# Patient Record
Sex: MALE | Race: WHITE | URBAN - METROPOLITAN AREA
[De-identification: names, ages, dates, MRNs, and addresses within clinical notes are randomized per-mention and may not be internally consistent; named-entity substitution may affect disease eponyms.]

---

## 2017-04-19 ENCOUNTER — TELEPHONE (OUTPATIENT)
Dept: OTHER | Facility: CLINIC | Age: 22
End: 2017-04-19

## 2017-07-08 ENCOUNTER — OFFICE VISIT (OUTPATIENT)
Dept: URGENT CARE | Facility: URGENT CARE | Age: 22
End: 2017-07-08
Payer: COMMERCIAL

## 2017-07-08 VITALS
OXYGEN SATURATION: 97 % | SYSTOLIC BLOOD PRESSURE: 110 MMHG | HEART RATE: 71 BPM | DIASTOLIC BLOOD PRESSURE: 60 MMHG | TEMPERATURE: 98 F

## 2017-07-08 DIAGNOSIS — H10.33 ACUTE CONJUNCTIVITIS OF BOTH EYES, UNSPECIFIED ACUTE CONJUNCTIVITIS TYPE: Primary | ICD-10-CM

## 2017-07-08 PROCEDURE — 99213 OFFICE O/P EST LOW 20 MIN: CPT | Performed by: FAMILY MEDICINE

## 2017-07-08 RX ORDER — CIPROFLOXACIN HYDROCHLORIDE 3.5 MG/ML
1 SOLUTION/ DROPS TOPICAL 3 TIMES DAILY
Qty: 1.1 ML | Refills: 0 | Status: SHIPPED | OUTPATIENT
Start: 2017-07-08 | End: 2017-07-15

## 2017-07-08 NOTE — MR AVS SNAPSHOT
"              After Visit Summary   2017    Festus Cantu    MRN: [id number]           Patient Information     Date Of Birth          1995        Visit Information        Provider Department      2017 3:55 PM Angel Rangel MD [facility]        Today's Diagnoses     Acute conjunctivitis of both eyes, unspecified acute conjunctivitis type    -  1       Follow-ups after your visit        Who to contact     If you have questions or need follow up information about today's clinic visit or your schedule please contact [facility] directly at [phone].  Normal or non-critical lab and imaging results will be communicated to you by MyChart, letter or phone within 4 business days after the clinic has received the results. If you do not hear from us within 7 days, please contact the clinic through [employer] or phone. If you have a critical or abnormal lab result, we will notify you by phone as soon as possible.  Submit refill requests through [employer] or call your pharmacy and they will forward the refill request to us. Please allow 3 business days for your refill to be completed.          Additional Information About Your Visit        MyChart Information     [employer] lets you send messages to your doctor, view your test results, renew your prescriptions, schedule appointments and more. To sign up, go to [email or web address] . Click on \"Log in\" on the left side of the screen, which will take you to the Welcome page. Then click on \"Sign up Now\" on the right side of the page.     You will be asked to enter the access code listed below, as well as some personal information. Please follow the directions to create your username and password.     Your access code is: [email or web address]  Expires: 11/3/2017  4:07 PM     Your access code will  in 90 days. If you need help or a new code, please call your [place] clinic or [phone].        Care EveryWhere ID     This is " your Care EveryWhere ID. This could be used by other organizations to access your [facility] medical records  [id number]        Your Vitals Were     Pulse Temperature Pulse Oximetry             71 98  F (36.7  C) (Oral) 97%          Blood Pressure from Last 3 Encounters:   07/08/17 110/60   08/21/15 109/63    Weight from Last 3 Encounters:   08/21/15 137 lb 1 oz (62.2 kg) (26 %)*     * Growth percentiles are based on [facility] 2-20 Years data.              Today, you had the following     No orders found for display         Today's Medication Changes          These changes are accurate as of: 7/8/17 11:59 PM.  If you have any questions, ask your nurse or doctor.               Start taking these medicines.        Dose/Directions    ciprofloxacin 0.3 % ophthalmic solution   Commonly known as:  CILOXAN   Used for:  Acute conjunctivitis of both eyes, unspecified acute conjunctivitis type        Dose:  1 drop   Apply 1 drop to eye 3 times daily for 7 days   Quantity:  1.1 mL   Refills:  0            Where to get your medicines      These medications were sent to [id number] [address]  [address], [facility] [address]     Phone:  [phone]     ciprofloxacin 0.3 % ophthalmic solution                Primary Care Provider    None Specified       No primary provider on file.        Equal Access to Services     CORBY EMERY AH: Luther foxo Soclarkali, waaxda luqadaha, qaybta kaalmada adeegyada, aguila davis. So [facility] [phone].    ATENCIÓN: Si habla español, tiene a poe disposición servicios gratuitos de asistencia lingüística. Llame al [phone].    We comply with applicable federal civil rights laws and [place] laws. We do not discriminate on the basis of race, color, national origin, age, disability sex, sexual orientation or gender identity.            Thank you!     Thank you for choosing [facility]  for your care. Our goal is  always to provide you with excellent care. Hearing back from our patients is one way we can continue to improve our services. Please take a few minutes to complete the written survey that you may receive in the mail after your visit with us. Thank you!             Your Updated Medication List - Protect others around you: Learn how to safely use, store and throw away your medicines at www.disposemymeds.org.          This list is accurate as of: 7/8/17 11:59 PM.  Always use your most recent med list.                   Brand Name Dispense Instructions for use Diagnosis    ciprofloxacin 0.3 % ophthalmic solution    CILOXAN    1.1 mL    Apply 1 drop to eye 3 times daily for 7 days    Acute conjunctivitis of both eyes, unspecified acute conjunctivitis type

## 2017-07-08 NOTE — NURSING NOTE
"Chief Complaint   Patient presents with     Urgent Care     Conjunctivitis       Initial /60 (BP Location: Right arm, Patient Position: Chair, Cuff Size: Adult Regular)  Pulse 71  Temp 98  F (36.7  C) (Oral)  SpO2 97% Estimated body mass index is 18.46 kg/(m^2) as calculated from the following:    Height as of 8/21/15: 6' 0.25\" (1.835 m).    Weight as of 8/21/15: 137 lb 1 oz (62.2 kg).  Medication Reconciliation: complete     Alysa Duarte CMA (AAMA)        "

## 2017-07-08 NOTE — PROGRESS NOTES
SUBJECTIVE:                                                    Festus Cantu is a 21 year old male who presents to clinic today for the following health issues:      Eye(s) Problem      Duration: x5 days    Description:  Location: right  Pain: YES  Redness: YES  Discharge: no    Accompanying signs and symptoms: itchy    History (Trauma, foreign body exposure,): None    Precipitating or alleviating factors (contact use): None    Therapies tried and outcome: None        OBJECTIVE:   Patient appears well, vitals signs are normal. Eyes: both eyes with findings of typical conjunctivitis noted; erythema and discharge. PERRLA, no foreign body noted. No periorbital cellulitis. The corneas are clear and fundi normal. Visual acuity normal.     ASSESSMENT:   Conjunctivitis - probably bacterial    PLAN:   Antibiotic drops per order. Hygiene discussed. If other family members develop same condition, may use same medication for them if they are not known to be allergic to it. Call prn.